# Patient Record
Sex: MALE | Race: AMERICAN INDIAN OR ALASKA NATIVE
[De-identification: names, ages, dates, MRNs, and addresses within clinical notes are randomized per-mention and may not be internally consistent; named-entity substitution may affect disease eponyms.]

---

## 2017-10-03 ENCOUNTER — HOSPITAL ENCOUNTER (EMERGENCY)
Dept: HOSPITAL 5 - ED | Age: 27
Discharge: HOME | End: 2017-10-03
Payer: COMMERCIAL

## 2017-10-03 VITALS — DIASTOLIC BLOOD PRESSURE: 78 MMHG | SYSTOLIC BLOOD PRESSURE: 123 MMHG
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
## 2017-10-03 DIAGNOSIS — S16.1XXA: Primary | ICD-10-CM

## 2017-10-03 DIAGNOSIS — N39.0: ICD-10-CM

## 2017-10-03 DIAGNOSIS — R20.0: ICD-10-CM

## 2017-10-03 DIAGNOSIS — M25.512: ICD-10-CM
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
## 2017-10-03 DIAGNOSIS — F17.200: ICD-10-CM

## 2017-10-03 DIAGNOSIS — Z88.8: ICD-10-CM

## 2017-10-03 DIAGNOSIS — M62.82: ICD-10-CM

## 2017-10-03 DIAGNOSIS — R74.8: ICD-10-CM
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
## 2017-10-03 LAB
ALBUMIN SERPL-MCNC: 4.3 G/DL (ref 3.9–5)
ALBUMIN/GLOB SERPL: 1.2 %
ALP SERPL-CCNC: 55 UNITS/L (ref 35–129)
ALT SERPL-CCNC: 28 UNITS/L (ref 7–56)
ANION GAP SERPL CALC-SCNC: 20 MMOL/L
BASOPHILS NFR BLD AUTO: 1.1 % (ref 0–1.8)
BILIRUB DIRECT SERPL-MCNC: < 0.2 MG/DL (ref 0–0.2)
BILIRUB SERPL-MCNC: 0.5 MG/DL (ref 0.1–1.2)
BILIRUB UR QL STRIP: (no result)
BLOOD UR QL VISUAL: (no result)
BUN SERPL-MCNC: 19 MG/DL (ref 9–20)
BUN/CREAT SERPL: 16 %
CALCIUM SERPL-MCNC: 9.8 MG/DL (ref 8.4–10.2)
CHLORIDE SERPL-SCNC: 100.7 MMOL/L (ref 98–107)
CK SERPL-CCNC: 1813 UNITS/L (ref 55–170)
CO2 SERPL-SCNC: 24 MMOL/L (ref 22–30)
EOSINOPHIL NFR BLD AUTO: 1.7 % (ref 0–4.3)
GLUCOSE SERPL-MCNC: 102 MG/DL (ref 75–100)
HCT VFR BLD CALC: 48.4 % (ref 35.5–45.6)
HGB BLD-MCNC: 16.6 GM/DL (ref 11.8–15.2)
KETONES UR STRIP-MCNC: (no result) MG/DL
LEUKOCYTE ESTERASE UR QL STRIP: (no result)
MCH RBC QN AUTO: 31 PG (ref 28–32)
MCHC RBC AUTO-ENTMCNC: 34 % (ref 32–34)
MCV RBC AUTO: 90 FL (ref 84–94)
MUCOUS THREADS #/AREA URNS HPF: (no result) /HPF
NITRITE UR QL STRIP: (no result)
PH UR STRIP: 5 [PH] (ref 5–7)
PLATELET # BLD: 217 K/MM3 (ref 140–440)
POTASSIUM SERPL-SCNC: 3.5 MMOL/L (ref 3.6–5)
PROT SERPL-MCNC: 7.8 G/DL (ref 6.3–8.2)
RBC # BLD AUTO: 5.36 M/MM3 (ref 3.65–5.03)
RBC #/AREA URNS HPF: 4 /HPF (ref 0–6)
SODIUM SERPL-SCNC: 141 MMOL/L (ref 137–145)
UROBILINOGEN UR-MCNC: < 2 MG/DL (ref ?–2)
WBC # BLD AUTO: 7.3 K/MM3 (ref 4.5–11)
WBC #/AREA URNS HPF: 11 /HPF (ref 0–6)

## 2017-10-03 PROCEDURE — 99284 EMERGENCY DEPT VISIT MOD MDM: CPT

## 2017-10-03 PROCEDURE — 80048 BASIC METABOLIC PNL TOTAL CA: CPT

## 2017-10-03 PROCEDURE — 80074 ACUTE HEPATITIS PANEL: CPT

## 2017-10-03 PROCEDURE — 96360 HYDRATION IV INFUSION INIT: CPT

## 2017-10-03 PROCEDURE — 81001 URINALYSIS AUTO W/SCOPE: CPT

## 2017-10-03 PROCEDURE — 85025 COMPLETE CBC W/AUTO DIFF WBC: CPT

## 2017-10-03 PROCEDURE — 73030 X-RAY EXAM OF SHOULDER: CPT

## 2017-10-03 PROCEDURE — 70450 CT HEAD/BRAIN W/O DYE: CPT

## 2017-10-03 PROCEDURE — 82550 ASSAY OF CK (CPK): CPT

## 2017-10-03 PROCEDURE — 36415 COLL VENOUS BLD VENIPUNCTURE: CPT

## 2017-10-03 PROCEDURE — 87086 URINE CULTURE/COLONY COUNT: CPT

## 2017-10-03 PROCEDURE — 96372 THER/PROPH/DIAG INJ SC/IM: CPT

## 2017-10-03 NOTE — EMERGENCY DEPARTMENT REPORT
ED Neuro Deficit HPI





- General


Chief Complaint: Neuro Symptoms/Deficit


Stated Complaint: NUMBNESS R SIDE OF FACE


Time Seen by Provider: 10/03/17 07:44


Source: patient


Mode of arrival: Ambulatory


Limitations: No Limitations





- History of Present Illness


Initial Comments: 





Patient airport and numbness to right face and right arm since  

afternoon.  He states that he has limited range of motion in his left arm.  

Patient's that he has pain on movement to his right scapula.  Pain is 10 out of 

10 and achy.  Denies any loss of sensation to his right upper extremity.  He 

said he was doing pushups but this is nothing new for him and he went to bed 

and when he woke up he started having symptoms denies any chest pain.  He is 

also complaining the pain to the right side of his neck.  Denies any fever or 

chills.  Denies any headache or dizziness.  Patient is HIV positive but he does 

not follow-up with any specific clinic.  He does not have a primary care doctor 

but he said he would like to be referred to one.  Denies any nausea or 

vomiting.  Denies any abdominal or back pain


Onset/Timin


-: days(s)


Location: right arm (Limited range of motion), other (left shoulder and left 

neck)


Presenting Symptoms: Present: Weak/Paralyzed One Side (she reported numbness to 

right facial area and right arm also reported pain to right shoulder, and right 

neck and right scapula)


History of same: No


Place: home


Severity: severe


Quality: numb, constant


Improves With: rest


Worsens With: none


On Anticoagulants: No


Context: sudden onset


Associated Symptoms: weakness (right upper extremity).  denies: confusion, 

chest pain, cough, diaphoresis, fever/chills, headaches, loss of appetite, 

malise, nausea/vomiting, vertigo, seizures, shortness of breath, syncope


Treatments Prior to Arrival: none





- Related Data


Home Medications: 


 Previous Rx's











 Medication  Instructions  Recorded  Last Taken  Type


 


Cephalexin [Keflex] 500 mg PO Q8HR #21 cap 10/03/17 Unknown Rx


 


Naproxen [Naprosyn TAB] 500 mg PO BID PRN #10 tablet 10/03/17 Unknown Rx











Allergies/Adverse Reactions: 


 Allergies











Allergy/AdvReac Type Severity Reaction Status Date / Time


 


sulfamethoxazole Allergy  Anaphylaxis Verified 10/03/17 01:08





[From Bactrim]     


 


trimethoprim [From Bactrim] Allergy  Anaphylaxis Verified 10/03/17 01:08














ED Review of Systems


ROS: 


Stated complaint: NUMBNESS R SIDE OF FACE


Other details as noted in HPI





Comment: All other systems reviewed and negative


Constitutional: no symptoms reported


Respiratory: no symptoms reported


Cardiovascular: denies: chest pain, palpitations, dyspnea on exertion, orthopnea

, edema, syncope, paroxysmal nocturnal dyspnea


Gastrointestinal: denies: abdominal pain, nausea, vomiting, diarrhea, 

constipation, hematemesis, melena, hematochezia


Genitourinary: denies: urgency, dysuria, frequency, hematuria, discharge, 

testicular pain, testicular mass


Musculoskeletal: arthralgia.  denies: back pain, joint swelling, myalgia


Skin: denies: rash


Neurological: weakness, numbness.  denies: headache, paresthesias, confusion, 

abnormal gait, vertigo


Psychiatric: denies: anxiety





ED Past Medical Hx





- Past Medical History


Previous Medical History?: Yes


Hx HIV: Yes





- Surgical History


Past Surgical History?: No





- Family History


Family history: no significant





- Social History


Smoking Status: Current Every Day Smoker


Substance Use Type: None


Other Social History: 





Patient is single





- Medications


Home Medications: 


 Home Medications











 Medication  Instructions  Recorded  Confirmed  Last Taken  Type


 


Cephalexin [Keflex] 500 mg PO Q8HR #21 cap 10/03/17  Unknown Rx


 


Naproxen [Naprosyn TAB] 500 mg PO BID PRN #10 tablet 10/03/17  Unknown Rx














ED Neuro Physical Exam





- General


Limitations: No Limitations


General appearance: alert, in no apparent distress


Suspected Stroke: No





- Head


Head exam: Present: atraumatic, normocephalic, normal inspection





- Expanded Head Exam


  ** Expanded


Head exam: Absent: laceration, abrasion, contusion, hematoma, racoon eyes, 

pérez's sign, general tenderness, tenderness of temporal artery, CSF rhinorrhea

, CSF otorrhea





- Eye


Eye exam: Present: normal appearance, PERRL, EOMI.  Absent: nystagmus, 

periorbital swelling, periorbital tenderness


Pupils: Present: normal accommodation





- ENT


ENT exam: Present: normal exam, normal orophraynx, mucous membranes moist, TM's 

normal bilaterally, normal external ear exam, other (normal facial sensation. 

Patient able to open and close mouth without difficulties)





- Neck


Neck exam: Present: normal inspection, tenderness (right lateral neck), full 

ROM.  Absent: meningismus, lymphadenopathy





- Expanded Neck Exam


  ** Expanded


Neck exam: Present: tenderness (lateral neck), other (C-spine tenderness).  

Absent: midline deformity, anterior neck swelling, tracheal deviation





- Respiratory


Respiratory exam: Present: normal lung sounds bilaterally.  Absent: respiratory 

distress, wheezes, rales, rhonchi, stridor, chest wall tenderness, accessory 

muscle use, decreased breath sounds, prolonged expiratory





- Cardiovascular


Cardiovascular Exam: Present: normal rhythm, tachycardia, normal heart sounds.  

Absent: systolic murmur, diastolic murmur





- GI/Abdominal


GI/Abdominal exam: Present: soft, normal bowel sounds.  Absent: distended, 

tenderness, guarding, rebound, rigid, organomegaly, mass, bruit, pulsatile mass

, hernia





- Extremities Exam


Extremities exam: Present: normal inspection, tenderness (palpated at right 

shoulder blade joint), normal capillary refill, other (no clubbing cyanosis or 

edema to extremities, +2 pulses to all extremities.  No neurovascular compromise

).  Absent: full ROM (I did range of motion to right upper extremity at 

shoulder and arm), pedal edema, joint swelling, calf tenderness





- Expanded Upper Extremity Exam


  ** Right


General: Present: normal inspection.  Absent: laceration, abrasion, nail injury 

(#), foreign body, amputation, avulsion


Shoulder Exam: Present: normal inspection, tenderness.  Absent: full ROM (

Limited range of motion to right shoulder), swelling, abrasion, laceration, 

ecchymosis, deformity, crepidus, dislocation, erythema, tenderness over AC joint


Upper Arm exam: Present: normal inspection, full ROM.  Absent: tenderness, 

swelling, abrasion, laceration, ecchymosis, deformity, crepidus, dislocation, 

erythema


Elbow exam: Present: normal inspection, full ROM.  Absent: tenderness, swelling

, abrasion, laceration, ecchymosis, deformity, crepidus, dislocation, erythema, 

effusion, pain w/ pronation/supination, tenderness over radial head


Forearm Wrist exam: Present: normal inspection, full ROM.  Absent: tenderness, 

swelling, abrasion, laceration, ecchymosis, deformity, crepidus, dislocation, 

erythema, tenderness over anatomical snuff box, pain with axial thumb loading


Hand Wrist exam: Present: normal inspection, full ROM.  Absent: tenderness, 

swelling, abrasion, laceration, ecchymosis, deformity, crepidus, dislocation, 

erythema, amputation, nail avulsion, subungual hematoma


Neuro motor exam: Present: wrist extension intact, thumb opposition intact, 

thumb IP flexion intact


Neurosensory exam: Present: 2-point discrimination, radial nerve intact, ulnar 

nerve intact, median nerve intact


Vascular: Present: normal capillary refill, radial pulse, brachial pulse, ulnar 

pulse.  Absent: vascular compromise, Pallo, pulse deficit radial art, pulse 

deficit ulnar art, pulse deficit brachial art





- Back Exam


Back exam: Present: normal inspection, full ROM.  Absent: tenderness, CVA 

tenderness (R), CVA tenderness (L), muscle spasm, paraspinal tenderness, 

vertebral tenderness, rash noted





- Neurological Exam


Neurological exam: Present: alert, oriented X3, normal gait, reflexes normal.  

Absent: motor sensory deficit ( 3/5 strength in right upper extremity normal 

sensation)





- NIHSS


Assessment Interval: Baseline


1a. Level of Consciousness: alert


1b. LOC Questions: answers correctly


1c. LOC Commands: performs tasks correctly


2. Best Gaze: normal


3. Visual: no visual loss


4. Facial Palsy: normal symmetrical movement


5b. Motor Arm Right: no drift


5a. Motor Arm Left: no drift


6a. Motor Leg Left: no drift


6b. Motor Leg Right: no drift


7. Limb Ataxia: absent


8. Sensory: normal


9. Best Language: no aphasia


10. Dysarthria: normal


11. Extinction/Inattention: no abnormality


Total Score: 0


Stroke Severity: No Stroke Symptoms





- Psychiatric


Psychiatric exam: Present: normal affect, normal mood





- Skin


Skin exam: Present: warm, dry, intact, normal color.  Absent: rash





ED Course


 Vital Signs











  10/03/17 10/03/17 10/03/17





  00:55 07:51 08:09


 


Temperature 97.9 F 98.4 F 


 


Pulse Rate 111 H 63 


 


Respiratory 18 17 17





Rate   


 


Blood Pressure 118/71  


 


Blood Pressure  107/72 





[Left]   


 


O2 Sat by Pulse 98 98 98





Oximetry   














  10/03/17





  11:00


 


Temperature 


 


Pulse Rate 70


 


Respiratory 16





Rate 


 


Blood Pressure 


 


Blood Pressure 123/78





[Left] 


 


O2 Sat by Pulse 99





Oximetry 














- Reevaluation(s)


Reevaluation #1: 





10/03/17 09:54


Pt assessed by myself and Dr. Laird.  He is having significant shoulder pain 

with passive range of motion but range of motion to shoulder and right upper 

extremity is diminished.  Patient's CK is elevated and he is currently 

receiving an IV fluid normal saline 2 L.  Patient also received Toradol 60 mg 

IM and Decadron 10 mg IM in emergency room.


Reevaluation #2: 





10/03/17 11:00


Status post IV fluid, IV Toradol and Decadron patient would full range of 

motion to all extremities.  He said he still having some pain when he raises 

his right arm ovaries had but he is able to do that on active range of motion 

without any difficulties.  He denies any numbness at present.





- Lab Data


Result diagrams: 


 10/03/17 01:13





 10/03/17 01:13


 Lab Results











  10/03/17 10/03/17 10/03/17 Range/Units





  01:13 01:13 01:13 


 


WBC  7.3    (4.5-11.0)  K/mm3


 


RBC  5.36 H    (3.65-5.03)  M/mm3


 


Hgb  16.6 H    (11.8-15.2)  gm/dl


 


Hct  48.4 H    (35.5-45.6)  %


 


MCV  90    (84-94)  fl


 


MCH  31    (28-32)  pg


 


MCHC  34    (32-34)  %


 


RDW  13.1 L    (13.2-15.2)  %


 


Plt Count  217    (140-440)  K/mm3


 


Lymph % (Auto)  34.8    (13.4-35.0)  %


 


Mono % (Auto)  9.3 H    (0.0-7.3)  %


 


Eos % (Auto)  1.7    (0.0-4.3)  %


 


Baso % (Auto)  1.1    (0.0-1.8)  %


 


Lymph #  2.5    (1.2-5.4)  K/mm3


 


Mono #  0.7    (0.0-0.8)  K/mm3


 


Eos #  0.1    (0.0-0.4)  K/mm3


 


Baso #  0.1    (0.0-0.1)  K/mm3


 


Seg Neutrophils %  53.1    (40.0-70.0)  %


 


Seg Neutrophils #  3.9    (1.8-7.7)  K/mm3


 


Sodium   141   (137-145)  mmol/L


 


Potassium   3.5 L   (3.6-5.0)  mmol/L


 


Chloride   100.7   ()  mmol/L


 


Carbon Dioxide   24   (22-30)  mmol/L


 


Anion Gap   20   mmol/L


 


BUN   19   (9-20)  mg/dL


 


Creatinine   1.2   (0.8-1.5)  mg/dL


 


Estimated GFR   > 60   ml/min


 


BUN/Creatinine Ratio   16   %


 


Glucose   102 H   ()  mg/dL


 


Calcium   9.8   (8.4-10.2)  mg/dL


 


Total Bilirubin    0.50  (0.1-1.2)  mg/dL


 


Direct Bilirubin    < 0.2  (0-0.2)  mg/dL


 


AST    61 H  (5-40)  units/L


 


ALT    28  (7-56)  units/L


 


Alkaline Phosphatase    55  ()  units/L


 


Total Creatine Kinase    1813 H  ()  units/L


 


Total Protein    7.8  (6.3-8.2)  g/dL


 


Albumin    4.3  (3.9-5)  g/dL


 


Albumin/Globulin Ratio    1.2  %


 


Urine Color     (Yellow)  


 


Urine Turbidity     (Clear)  


 


Urine pH     (5.0-7.0)  


 


Ur Specific Gravity     (1.003-1.030)  


 


Urine Protein     (Negative)  mg/dL


 


Urine Glucose (UA)     (Negative)  mg/dL


 


Urine Ketones     (Negative)  mg/dL


 


Urine Blood     (Negative)  


 


Urine Nitrite     (Negative)  


 


Urine Bilirubin     (Negative)  


 


Urine Urobilinogen     (<2.0)  mg/dL


 


Ur Leukocyte Esterase     (Negative)  


 


Urine WBC (Auto)     (0.0-6.0)  /HPF


 


Urine RBC (Auto)     (0.0-6.0)  /HPF


 


U Epithel Cells (Auto)     (0-13.0)  /HPF


 


Hyaline Casts     /LPF


 


Urine Mucus     /HPF














  10/03/17 Range/Units





  09:57 


 


WBC   (4.5-11.0)  K/mm3


 


RBC   (3.65-5.03)  M/mm3


 


Hgb   (11.8-15.2)  gm/dl


 


Hct   (35.5-45.6)  %


 


MCV   (84-94)  fl


 


MCH   (28-32)  pg


 


MCHC   (32-34)  %


 


RDW   (13.2-15.2)  %


 


Plt Count   (140-440)  K/mm3


 


Lymph % (Auto)   (13.4-35.0)  %


 


Mono % (Auto)   (0.0-7.3)  %


 


Eos % (Auto)   (0.0-4.3)  %


 


Baso % (Auto)   (0.0-1.8)  %


 


Lymph #   (1.2-5.4)  K/mm3


 


Mono #   (0.0-0.8)  K/mm3


 


Eos #   (0.0-0.4)  K/mm3


 


Baso #   (0.0-0.1)  K/mm3


 


Seg Neutrophils %   (40.0-70.0)  %


 


Seg Neutrophils #   (1.8-7.7)  K/mm3


 


Sodium   (137-145)  mmol/L


 


Potassium   (3.6-5.0)  mmol/L


 


Chloride   ()  mmol/L


 


Carbon Dioxide   (22-30)  mmol/L


 


Anion Gap   mmol/L


 


BUN   (9-20)  mg/dL


 


Creatinine   (0.8-1.5)  mg/dL


 


Estimated GFR   ml/min


 


BUN/Creatinine Ratio   %


 


Glucose   ()  mg/dL


 


Calcium   (8.4-10.2)  mg/dL


 


Total Bilirubin   (0.1-1.2)  mg/dL


 


Direct Bilirubin   (0-0.2)  mg/dL


 


AST   (5-40)  units/L


 


ALT   (7-56)  units/L


 


Alkaline Phosphatase   ()  units/L


 


Total Creatine Kinase   ()  units/L


 


Total Protein   (6.3-8.2)  g/dL


 


Albumin   (3.9-5)  g/dL


 


Albumin/Globulin Ratio   %


 


Urine Color  Yellow  (Yellow)  


 


Urine Turbidity  Clear  (Clear)  


 


Urine pH  5.0  (5.0-7.0)  


 


Ur Specific Gravity  1.028  (1.003-1.030)  


 


Urine Protein  30 mg/dl  (Negative)  mg/dL


 


Urine Glucose (UA)  Neg  (Negative)  mg/dL


 


Urine Ketones  Neg  (Negative)  mg/dL


 


Urine Blood  Neg  (Negative)  


 


Urine Nitrite  Neg  (Negative)  


 


Urine Bilirubin  Neg  (Negative)  


 


Urine Urobilinogen  < 2.0  (<2.0)  mg/dL


 


Ur Leukocyte Esterase  Neg  (Negative)  


 


Urine WBC (Auto)  11.0 H  (0.0-6.0)  /HPF


 


Urine RBC (Auto)  4.0  (0.0-6.0)  /HPF


 


U Epithel Cells (Auto)  < 1.0  (0-13.0)  /HPF


 


Hyaline Casts  5  /LPF


 


Urine Mucus  1+  /HPF








Urine culture pending





- Radiology Data


Radiology results: report reviewed


3 right shoulder reveal no acute abnormality





- Medical Decision Making





ED course: Patient and present with numbness and pain to right upper extremity 

that has been ongoing since  of this week.  He had no other symptoms and 

no headache, dizziness, nausea or vomiting, neck stiffness but he did have right

-sided neck pain.  No fever or chills.  No back pain.  No C-spine pain.  Denies 

any injuries.  Patient has a history of HIV but he is not being followed by 

infectious disease doctor because he said he hasn't had the time to find one.  

He is also requesting an primary care physician.  CT scan of the had without 

contrast done which revealed no acute intracranial processes.  Patient given 1 

L for IV fluid after reporting that he did 100 pushups throughout that day and 

 and he went to bed and woke up with pain.  CK was greater than 1800.  He 

also received Toradol 60 mg IM and Decadron 10 mg IM.  CBC is stable and 

reflect hemoconcentration which could mean the patient is dehydrated and CMP is 

stable.  He said he felt better after medication and IV fluid and he is able to 

move all extremities without any difficulties and numbness has resolved.  I 

discussed the patient all his labs and diagnostics results and he voiced 

understanding.  Also discussed with him that he needs to return to emergency 

room for recheck tomorrow.  I discussed with him that he has to have his CK 

levels rechecked to make sure that they are decreasing.  I also instructed him 

to drink 2-3 L of fluid daily to include water, Gatorade.  I discussed with him 

that he should try to avoid carbonated beverages.  I also discussed the patient 

that he needs to follow-up at infectious disease doctor and I will refer him to 

an infectious disease doctor for follow-up HIV.  He voices understanding and is 

discharged diagnoses and treatment plan and knows that he needs to return 

tomorrow approximately 11 AM to get CK level recheck.  Patient is in stable 

condition discharged home with prescription for naproxen.





- Core Measures


Measure Exclusions: not indicated


Critical care attestation.: 


If time is entered above; I have spent that time in minutes in the direct care 

of this critically ill patient, excluding procedure time.








ED Disposition


Clinical Impression: 


 Arm paresthesia, right, Arthralgia of shoulder region, right, Elevated CK, UTI 

(urinary tract infection) with pyuria





Neck muscle strain


Qualifiers:


 Encounter type: initial encounter Qualified Code(s): S16.1XXA - Strain of 

muscle, fascia and tendon at neck level, initial encounter





Rhabdomyolysis


Qualifiers:


 Rhabdomyolysis type: traumatic Encounter type: initial encounter Qualified Code

(s): T79.6XXA - Traumatic ischemia of muscle, initial encounter





Disposition:  TO HOME OR SELFCARE


Is pt being admited?: No


Does the pt Need Aspirin: No


Condition: Stable


Instructions:  Muscle Strain (ED), Urinary Tract Infection in Men (ED), 

Rhabdomyolysis (ED), Paresthesia (ED), Arthralgia (ED)


Additional Instructions: 


Please increase  fluid intake to 2-3 L of fluid per day as instructed


Please take Keflex 500 mg 3 times a day for elevated white count and urine


Please follow up with primary care physician and infectious disease doctor as 

instructed and call today to schedule appointment for physical.


avoid from doing any strenuous activity over the next week.


take naproxen for pain as scheduled


 CT scan of the had and x-ray of your shoulder was normal.


 Remember to return to emergency room at 11 AM on 10/04/2017 for repeat CK 

level check.


Prescriptions: 


Cephalexin [Keflex] 500 mg PO Q8HR #21 cap


Naproxen [Naprosyn TAB] 500 mg PO BID PRN #10 tablet


 PRN Reason: Pain


Referrals: 


СВЕТЛАНА GEORGES MD [Staff Physician] - 2-3 Days


MARIAM TAYLOR MD [Staff Physician] - 2-3 Days


Forms:  Work/School Release Form(ED)

## 2017-10-03 NOTE — XRAY REPORT
RIGHT SHOULDER:



Pain during nontraumatic exercise.

Routine views demonstrate normal bony and soft tissue structures

with normal joint alignment of the shoulder.



IMPRESSION:

Normal study.

## 2017-10-03 NOTE — CAT SCAN REPORT
FINAL REPORT



EXAM:  CT HEAD/BRAIN WO CON



HISTORY:  Right Face and arm numbness 



TECHNIQUE:  CT imaging acquired through the head without

intravenous contrast.  Transaxial reformations are provided. 



PRIORS:  None.



FINDINGS:  

The ventricles, cisterns and sulci are normal. No

intraparenchymal or extra-axial mass, hemorrhage, or mass effect.

 Gray and white-matter differentiation is normal. 



Normal spherical shape of the globes. Paranasal sinuses and

mastoid air cells are clear. No skull or facial fracture

visualized. 



IMPRESSION:  

No acute intracranial abnormality. Consider additional imaging

including MRI for worsening/persistent symptoms.

## 2018-04-11 ENCOUNTER — HOSPITAL ENCOUNTER (EMERGENCY)
Dept: HOSPITAL 5 - ED | Age: 28
Discharge: HOME | End: 2018-04-11
Payer: COMMERCIAL

## 2018-04-11 VITALS — DIASTOLIC BLOOD PRESSURE: 61 MMHG | SYSTOLIC BLOOD PRESSURE: 105 MMHG

## 2018-04-11 DIAGNOSIS — F17.200: ICD-10-CM

## 2018-04-11 DIAGNOSIS — L25.9: Primary | ICD-10-CM

## 2018-04-11 PROCEDURE — 99282 EMERGENCY DEPT VISIT SF MDM: CPT

## 2018-04-11 PROCEDURE — 96372 THER/PROPH/DIAG INJ SC/IM: CPT

## 2018-04-11 NOTE — EMERGENCY DEPARTMENT REPORT
ED Rash HPI





- HPI


Chief Complaint: Skin Rash


Stated Complaint: RASH


Time Seen by Provider: 04/11/18 12:01


Duration: 4 Days


Location: Chest, Back, Upper Extremities


Suspected Cause: Unknown


Rash Symptoms: Yes Itching, No Facial Swelling, No Tongue/Oral Swelling, No 

Breathing Difficulties, No Choking Sensation, No Wheezing/Dyspnea, No Peeling, 

No Blistering, No Fever, No Malaise, No Myalgias


Severity: severe


Other History: Patient here complaining of rash 4 days of itching.  He said he 

has been using hydrogen peroxide on the rash.  He reports severe itching.  

Patient has a history of HIV and has not been followed by infectious disease.  

He said he was on medication but he came off several months ago.  Patient does 

have access to primary care.  Patient reports that he is not sure what causes a 

rash but he reported new soap larger detergents.  Denies any respiratory 

symptoms.  Denies any fever or chills or nausea or vomiting.





ED Review of Systems


ROS: 


Stated complaint: RASH


Other details as noted in HPI





Comment: All other systems reviewed and negative


Constitutional: no symptoms reported


ENT: denies: throat pain


Respiratory: no symptoms reported


Cardiovascular: denies: chest pain, palpitations, dyspnea on exertion, edema, 

syncope, paroxysmal nocturnal dyspnea


Gastrointestinal: denies: nausea, vomiting


Musculoskeletal: denies: arthralgia


Skin: rash, pruritus


Neurological: denies: headache





ED Past Medical Hx





- Past Medical History


Previous Medical History?: Yes


Hx HIV: Yes





- Surgical History


Past Surgical History?: No





- Social History


Smoking Status: Current Every Day Smoker


Substance Use Type: Alcohol





- Medications


Home Medications: 


 Home Medications











 Medication  Instructions  Recorded  Confirmed  Last Taken  Type


 


Cephalexin [Keflex] 500 mg PO Q8HR #21 cap 10/03/17  Unknown Rx


 


Naproxen [Naprosyn TAB] 500 mg PO BID PRN #10 tablet 10/03/17  Unknown Rx


 


diphenhydrAMINE [Benadryl CAP] 50 mg PO Q8HR PRN #12 capsule 04/11/18  Unknown 

Rx


 


predniSONE [Deltasone] 50 mg PO QDAY 5 Days #5 tab 04/11/18  Unknown Rx














Rash Exam





- Exam


General: 


Vital signs noted. No distress. Alert and acting appropriately.


This is a 27-year-old male well-nourished well-developed in no acute distress.


HEENT: No Periorbital Edema, No Conjuctival Injection, No Chemosis, No Perioral 

Edema, No Tongue Edema, No Uvular Edema, No Compromised Airway, No Drooling


Lungs: Yes Good Air Exchange, No Wheezes, No Ronchi, No Stridor, No Cough, No 

Labored Respirations, No Retractions, No Use of Accessory Muscles, No Other 

Abnormal Lung Sounds


Heart: Yes Regular (S1 and S2), No Murmur


Skin: Yes Maculopapular Rash (anteriorly and posteriorly.  Upper extremities.), 

Yes Erythema, No Morbilliform rash, No Bulla(e), No Excoriations, No Weeping, 

No Tenderness, No Edema, No Encrustations


Other: Positive: Abdomen Normal, Neurologic Normal, Musculoskeletal Normal





ED Course


 Vital Signs











  04/11/18





  11:23


 


Temperature 97.2 F L


 


Pulse Rate 73


 


Respiratory 18





Rate 


 


Blood Pressure 105/61


 


O2 Sat by Pulse 100





Oximetry 














- Reevaluation(s)


Reevaluation #1: 





04/11/18 12:27


Patient received Benadryl 50 mg IM and Decadron 10 mg IM for contact dermatitis.





ED Medical Decision Making





- Medical Decision Making





ED course: Patient here with maculopapular rash with appear to be contact 

dermatitis.  He is stable.  Vital signs are stable.  Patient given Decadron 10 

mg IM and Benadryl 50 mg IM without any adverse reaction.  Patient will be 

discharged home with prescription for prednisone and Benadryl and to follow up 

with his primary care physician and 2-3 days.  He does not have a primary care 

physician although he does have access so I will refer him to outside [facility] and also Dr. Ralph Willis and Dr. rincon who is dermatologist.


Critical care attestation.: 


If time is entered above; I have spent that time in minutes in the direct care 

of this critically ill patient, excluding procedure time.








ED Disposition


Clinical Impression: 


 Pruritic dermatitis





Contact dermatitis


Qualifiers:


 Contact dermatitis type: allergic Contact dermatitis trigger: unspecified 

trigger Qualified Code(s): L23.9 - Allergic contact dermatitis, unspecified 

cause





Disposition: DC-01 TO HOME OR SELFCARE


Is pt being admited?: No


Does the pt Need Aspirin: No


Condition: Stable


Instructions:  Contact Dermatitis (ED), Itchy Skin (ED)


Additional Instructions: 


Please follow up with dermatologist as instructed


Take medication as instructed please do not drive or operate heavy machinery 

while taking Benadryl as this medication causes drowsiness


See primary care referral for Dr. Ralph Willis and also some [facility]


Prescriptions: 


diphenhydrAMINE [Benadryl CAP] 50 mg PO Q8HR PRN #12 capsule


 PRN Reason: contact dermatitis


predniSONE [Deltasone] 50 mg PO QDAY 5 Days #5 tab


Referrals: 


RALPH WILLIS MD [Staff Physician] - 3-5 Days


NATHANIEL GALLEGOS MD [Staff Physician] - 3-5 Days


[facility] [Outside] - 3-5 Days


Forms:  Accompanied Note, Work/School Release Form(ED)